# Patient Record
Sex: MALE | Race: WHITE | NOT HISPANIC OR LATINO | Employment: FULL TIME | URBAN - METROPOLITAN AREA
[De-identification: names, ages, dates, MRNs, and addresses within clinical notes are randomized per-mention and may not be internally consistent; named-entity substitution may affect disease eponyms.]

---

## 2020-05-20 ENCOUNTER — HOSPITAL ENCOUNTER (OUTPATIENT)
Dept: GENERAL RADIOLOGY | Facility: HOSPITAL | Age: 40
Discharge: HOME | End: 2020-05-20
Admitting: INTERNAL MEDICINE

## 2020-05-20 ENCOUNTER — TRANSCRIBE ORDERS (OUTPATIENT)
Dept: ADMINISTRATIVE | Facility: HOSPITAL | Age: 40
End: 2020-05-20

## 2020-05-20 DIAGNOSIS — M25.522 LEFT ELBOW PAIN: ICD-10-CM

## 2020-05-20 DIAGNOSIS — M25.522 LEFT ELBOW PAIN: Primary | ICD-10-CM

## 2020-05-20 PROCEDURE — 73070 X-RAY EXAM OF ELBOW: CPT

## 2020-10-06 ENCOUNTER — CONSULT (OUTPATIENT)
Dept: CARDIOLOGY | Facility: CLINIC | Age: 40
End: 2020-10-06

## 2020-10-06 VITALS
HEIGHT: 75 IN | DIASTOLIC BLOOD PRESSURE: 70 MMHG | BODY MASS INDEX: 26.24 KG/M2 | WEIGHT: 211 LBS | SYSTOLIC BLOOD PRESSURE: 100 MMHG | TEMPERATURE: 97.3 F | HEART RATE: 66 BPM

## 2020-10-06 DIAGNOSIS — R00.2 PALPITATIONS: Primary | ICD-10-CM

## 2020-10-06 PROCEDURE — 99213 OFFICE O/P EST LOW 20 MIN: CPT | Performed by: PHYSICIAN ASSISTANT

## 2020-10-06 PROCEDURE — 93000 ELECTROCARDIOGRAM COMPLETE: CPT | Performed by: PHYSICIAN ASSISTANT

## 2020-10-06 RX ORDER — BUPROPION HYDROCHLORIDE 100 MG/1
100 TABLET ORAL DAILY
COMMUNITY
Start: 2020-09-06

## 2020-10-06 RX ORDER — ESCITALOPRAM OXALATE 20 MG/1
25 TABLET ORAL DAILY
COMMUNITY

## 2020-10-06 NOTE — PROGRESS NOTES
[facility]  INITIAL OFFICE CONSULT      Antonio Degroot  1980  PCP: Katy Menard MD    SUBJECTIVE:   Antonio Degroot is a 40 y.o. male seen for a consultation visit regarding the following:     Chief Complaint:   Chief Complaint   Patient presents with   • Palpitations          Consultation is requested by Katy Menard MD for evaluation of Palpitations      History:  Pleasant 40-year-old gentleman presents today for cardiac evaluation regarding palpitation.  Denies any previous cardiac history.  He states in July he started having a sensation or feeling of skipped beats that lasted a few seconds.  He states he was, rapid in nature but once they started they will, be off and on throughout the day.  These were not associated any chest pain, chest tightness, heart failure symptoms or shortness of breath.  He denies any episodes of syncope or near syncope events.  He is primary care provider pursued a 24-hour monitor which revealed occasional PVCs.  He states that he would use metoprolol as needed at this time.  They seem to be waning at this time he is not having recurrent symptoms.  He is exercising during basis 3 days a week without any symptoms.      Cardiac PMH: (Old records have been reviewed and summarized below)  1. Palpitations  a. Abnormal HOLTER, PVC's  2. Marginal SBP  3. Depression/Anxiety    Past Medical History, Past Surgical History, Family history, Social History, and Medications were all reviewed with the patient today and updated as necessary.     Current Outpatient Medications   Medication Sig Dispense Refill   • buPROPion (WELLBUTRIN) 100 MG tablet Take 100 mg by mouth Daily.     • escitalopram (LEXAPRO) 20 MG tablet Take 25 mg by mouth Daily.       No current facility-administered medications for this visit.      No Known Allergies      Past Medical History:   Diagnosis Date   • Chronic depression      Past Surgical History:   Procedure Laterality  "Date   • WISDOM TOOTH EXTRACTION       Family History   Problem Relation Age of Onset   • Skin cancer Paternal Grandfather      Social History     Tobacco Use   • Smoking status: Former Smoker   • Smokeless tobacco: Former User   Substance Use Topics   • Alcohol use: Yes       ROS:  Review of Symptoms:  General: no recent weight loss/gain, weakness or fatigue  Skin: no rashes, lumps, or other skin changes  HEENT: no dizziness, lightheadedness, or vision changes  Respiratory: no cough or hemoptysis  Cardiovascular: + palpitations, and tachycardia  Gastrointestinal: no black/tarry stools or diarrhea  Urinary: no change in frequency or urgency  Peripheral Vascular: no claudication or leg cramps  Musculoskeletal: no muscle or joint pain/stiffness  Psychiatric: no depression or excessive stress  Neurological: no sensory or motor loss, no syncope  Hematologic: no anemia, easy bruising or bleeding  Endocrine: no thyroid problems, nor heat or cold intolerance         PHYSICAL EXAM:   /70 (BP Location: Right arm, Patient Position: Sitting)   Pulse 66   Temp 97.3 °F (36.3 °C)   Ht 190.5 cm (75\")   Wt 95.7 kg (211 lb)   BMI 26.37 kg/m²      Wt Readings from Last 5 Encounters:   10/06/20 95.7 kg (211 lb)     BP Readings from Last 5 Encounters:   10/06/20 100/70       General-Well Nourished, Well developed  Eyes - PERRLA  Neck- supple, No mass  CV- regular rate and rhythm, no MRG  Lung- clear bilaterally  Abd- soft, +BS  Musc/skel - Norm strength and range of motion  Skin- warm and dry  Neuro - Alert & Oriented x 3, appropriate mood.    Medical problems and test results were reviewed with the patient today.     No results found for this or any previous visit.      No results found for: CHOL, HDL, HDLC, LDL, LDLC, VLDL    EKG:  (EKG/Tracing has been independently visualized by me and summarized below)      ECG 12 Lead    Date/Time: 10/6/2020 9:34 AM  Performed by: Chris Avalos PA  Authorized by: Chris Avalos " SRIDHAR, PA   Comparison: not compared with previous ECG   Rhythm: sinus rhythm  Rate: normal  Conduction: conduction normal  ST Segments: ST segments normal  T Waves: T waves normal  QRS axis: normal  Other: no other findings    Clinical impression: normal ECG          ASSESSMENT   1. Palpitations/PVC's on Holter.   2. Marginal SBP  3. Anxiety/Depression    PLAN  · Patient symptoms have clinically nearly resolved at this time.  He does use occasional metoprolol as needed when he has a breakthrough event.  He would like to continue this course.  · If symptoms recur or progress in nature will consider further cardiac testing and extensive longer monitoring to obtain a another burden of PVCs.  · Return for 2 months or sooner as needed.     Cardiology/Electrophysiology  10/06/20  09:32 EDT  Will Robbie RODRIGEZ